# Patient Record
Sex: FEMALE | Race: OTHER
[De-identification: names, ages, dates, MRNs, and addresses within clinical notes are randomized per-mention and may not be internally consistent; named-entity substitution may affect disease eponyms.]

---

## 2021-10-20 ENCOUNTER — HOSPITAL ENCOUNTER (EMERGENCY)
Dept: HOSPITAL 61 - ER | Age: 31
LOS: 1 days | Discharge: HOME | End: 2021-10-21
Payer: SELF-PAY

## 2021-10-20 VITALS — BODY MASS INDEX: 34.21 KG/M2 | HEIGHT: 64 IN | WEIGHT: 200.4 LBS

## 2021-10-20 DIAGNOSIS — N39.0: ICD-10-CM

## 2021-10-20 DIAGNOSIS — O23.41: ICD-10-CM

## 2021-10-20 DIAGNOSIS — Z3A.01: ICD-10-CM

## 2021-10-20 DIAGNOSIS — O20.0: Primary | ICD-10-CM

## 2021-10-20 PROCEDURE — 81025 URINE PREGNANCY TEST: CPT

## 2021-10-20 PROCEDURE — 84702 CHORIONIC GONADOTROPIN TEST: CPT

## 2021-10-20 PROCEDURE — 86850 RBC ANTIBODY SCREEN: CPT

## 2021-10-20 PROCEDURE — 80053 COMPREHEN METABOLIC PANEL: CPT

## 2021-10-20 PROCEDURE — 86901 BLOOD TYPING SEROLOGIC RH(D): CPT

## 2021-10-20 PROCEDURE — 76801 OB US < 14 WKS SINGLE FETUS: CPT

## 2021-10-20 PROCEDURE — 36415 COLL VENOUS BLD VENIPUNCTURE: CPT

## 2021-10-20 PROCEDURE — 87086 URINE CULTURE/COLONY COUNT: CPT

## 2021-10-20 PROCEDURE — 85025 COMPLETE CBC W/AUTO DIFF WBC: CPT

## 2021-10-20 PROCEDURE — 76817 TRANSVAGINAL US OBSTETRIC: CPT

## 2021-10-20 PROCEDURE — 86900 BLOOD TYPING SEROLOGIC ABO: CPT

## 2021-10-20 PROCEDURE — 99284 EMERGENCY DEPT VISIT MOD MDM: CPT

## 2021-10-20 PROCEDURE — 81001 URINALYSIS AUTO W/SCOPE: CPT

## 2021-10-21 VITALS — SYSTOLIC BLOOD PRESSURE: 104 MMHG | DIASTOLIC BLOOD PRESSURE: 55 MMHG

## 2021-10-21 LAB
ALBUMIN SERPL-MCNC: 3.1 G/DL (ref 3.4–5)
ALBUMIN/GLOB SERPL: 0.8 {RATIO} (ref 1–1.7)
ALP SERPL-CCNC: 54 U/L (ref 46–116)
ALT SERPL-CCNC: 54 U/L (ref 14–59)
AMORPH SED URNS QL MICRO: PRESENT /HPF
ANION GAP SERPL CALC-SCNC: 8 MMOL/L (ref 6–14)
APTT PPP: YELLOW S
AST SERPL-CCNC: 31 U/L (ref 15–37)
BACTERIA #/AREA URNS HPF: (no result) /HPF
BASOPHILS # BLD AUTO: 0 X10^3/UL (ref 0–0.2)
BASOPHILS NFR BLD: 0 % (ref 0–3)
BILIRUB SERPL-MCNC: 0.1 MG/DL (ref 0.2–1)
BILIRUB UR QL STRIP: NEGATIVE
BUN SERPL-MCNC: 11 MG/DL (ref 7–20)
BUN/CREAT SERPL: 22 (ref 6–20)
CALCIUM SERPL-MCNC: 8.9 MG/DL (ref 8.5–10.1)
CHLORIDE SERPL-SCNC: 102 MMOL/L (ref 98–107)
CO2 SERPL-SCNC: 28 MMOL/L (ref 21–32)
CREAT SERPL-MCNC: 0.5 MG/DL (ref 0.6–1)
EOSINOPHIL NFR BLD: 0.2 X10^3/UL (ref 0–0.7)
EOSINOPHIL NFR BLD: 2 % (ref 0–3)
ERYTHROCYTE [DISTWIDTH] IN BLOOD BY AUTOMATED COUNT: 15 % (ref 11.5–14.5)
FIBRINOGEN PPP-MCNC: CLEAR MG/DL
GFR SERPLBLD BASED ON 1.73 SQ M-ARVRAT: 145.9 ML/MIN
GLUCOSE SERPL-MCNC: 91 MG/DL (ref 70–99)
HCT VFR BLD CALC: 36.4 % (ref 36–47)
HGB BLD-MCNC: 12.2 G/DL (ref 12–15.5)
LYMPHOCYTES # BLD: 2.4 X10^3/UL (ref 1–4.8)
LYMPHOCYTES NFR BLD AUTO: 27 % (ref 24–48)
MCH RBC QN AUTO: 27 PG (ref 25–35)
MCHC RBC AUTO-ENTMCNC: 34 G/DL (ref 31–37)
MCV RBC AUTO: 80 FL (ref 79–100)
MONO #: 0.7 X10^3/UL (ref 0–1.1)
MONOCYTES NFR BLD: 8 % (ref 0–9)
NEUT #: 5.7 X10^3/UL (ref 1.8–7.7)
NEUTROPHILS NFR BLD AUTO: 63 % (ref 31–73)
NITRITE UR QL STRIP: NEGATIVE
PH UR STRIP: 5.5 [PH]
PLATELET # BLD AUTO: 324 X10^3/UL (ref 140–400)
POTASSIUM SERPL-SCNC: 3.8 MMOL/L (ref 3.5–5.1)
PROT SERPL-MCNC: 6.8 G/DL (ref 6.4–8.2)
PROT UR STRIP-MCNC: NEGATIVE MG/DL
RBC # BLD AUTO: 4.53 X10^6/UL (ref 3.5–5.4)
RBC #/AREA URNS HPF: (no result) /HPF (ref 0–2)
SODIUM SERPL-SCNC: 138 MMOL/L (ref 136–145)
UROBILINOGEN UR-MCNC: 0.2 MG/DL
WBC # BLD AUTO: 9 X10^3/UL (ref 4–11)

## 2021-10-21 NOTE — RAD
EXAMINATION:  US OB <14 WKS +TV (FIRST TRIMESTER PELVIC ULTRASOUND)



CLINICAL HISTORY: VAGINAL BLEEDING DURING 



TECHNIQUE: Sonography of the pelvis was performed by transabdominal and transvaginal techniques.



COMPARISON: None. 





FINDINGS:



Uterus: 

- Orientation: Anteverted 

- Size: 10.2 x 7.4 x 5.6

- Myometrium: Homogeneous echogenicity

- Cervical Length:

    

Gestation:

- Intrauterine Gestational Sac: Single present with normal morphology and subjectively normal amnioti
c fluid volume

- Yolk Sac: Present 

- Embryo: Single present  

- Crown Rump Length: 15 mm, corresponding gestational age 7 weeks 6 days      

- Fetal Heart Rate: 162 bpm

- Perigestational Hemorrhage: Absent



Right Ovary: 

- Size: 3.8 x 2.9 x 2.7 cm

- Normal sonographic appearance and blood flow.  Dominant follicle versus cyst. 



Left Ovary: 

- Size: 3.2 x 3.4 x 2.3

- Normal sonographic appearance and blood flow. 



Pelvic Free Fluid: Absent 





IMPRESSION: 



Single live intrauterine gestation with sonographic estimated gestational age 7 weeks 6 days and cyril
mated date of delivery is 6/3/2022. 



Electronically signed by: Brayden Morales DO (10/21/2021 1:39 AM) [facility]

## 2021-10-21 NOTE — PHYS DOC
Past Medical History


Past Medical History:  No Pertinent History


Past Surgical History:  No Surgical History


Smoking Status:  Never Smoker


Alcohol Use:  Occasionally


Drug Use:  None





General Adult


EDM:


Chief Complaint:  VAGINAL BLEEDING PREGNANCY





HPI:


HPI:


32 yo  F (LMP ) presents the ED with , (patient consents to 

his/her/their knowledge and involvement in pts' medical care), complaints of 

bright red vaginal spotting, intermittently for the past month.  Patient states 

she got concerned because she is passed a clot today (has photos on her iphone).

 Reports she is having intermittent, suprapubic cramps as if she is about to 

have her menses.  Had a positive urine pregnancy test at [facility] on 

, was told she was 5 weeks pregnant at this time.  Patient reports 

she has monogamous with 1 partner, her , newlywed's of 6 months.  Denies 

any associated vaginal odor, itching or dyspareunia.  Denies any IV drug use, 

alcohol use or tobacco use.  Has no past surgical history.  Has no routine 

primary care physician and takes no prescribed medications. Reports h/o left 

ovarian cyst. Is not concerned for any sexually transmitted diseases.





Review of Systems:


Review of Systems:


Constitutional:   Denies fever or chills. []


Eyes:   Denies change in visual acuity. []


HENT:   Denies nasal congestion or sore throat. [] 


Respiratory:   Denies cough or shortness of breath. [] 


Cardiovascular:   Denies chest pain or edema. [] 


GI:   Denies  nausea, vomiting, bloody stools or diarrhea. [] 


:  Denies dysuria or hematuria


Musculoskeletal:   Denies back pain or flank pain. [] 


Integument:   Denies rash or diaphoresis


Neurologic:   Denies headache, focal weakness or sensory changes. [] 


Endocrine:   Denies polyuria or polydipsia. [] 


Lymphatic:  Denies swollen glands. [] 


Psychiatric:  Denies depression or anxiety. []





Heart Score:


C/O Chest Pain:  No


Risk Factors:


Risk Factors:  DM, Current or recent (<one month) smoker, HTN, HLP, family 

history of CAD, obesity.


Risk Scores:


Score 0 - 3:  2.5% MACE over next 6 weeks - Discharge Home


Score 4 - 6:  20.3% MACE over next 6 weeks - Admit for Clinical Observation


Score 7 - 10:  72.7% MACE over next 6 weeks - Early Invasive Strategies





Allergies:


Allergies:





Allergies








Coded Allergies Type Severity Reaction Last Updated Verified


 


  No Known Drug Allergies    10/7/14 No











Physical Exam:


PE:





Constitutional: Well developed, well nourished, no acute distress, non-toxic 

appearance. 


HENT: Normocephalic, atraumatic,


Eyes: EOMI, conjunctiva normal, no discharge.  


Neck: Normal range of motion,  supple, 


Cardiovascular: S1/2 present, regular rhythm


Lungs & Thorax: Speaking in full sentences, bilateral equal chest rise, no 

tachypnea or increased work of breathing


Abdomen:  soft, no tenderness, 


Skin: Warm, dry, no erythema, no rash. [] 


Back: No tenderness, no CVA tenderness. [] 


Extremities: No tenderness, no cyanosis, no lower extremity edema


Neurologic: Alert and oriented X 3, normal motor function, normal sensory 

function, no focal deficits noted. []


Psychologic: Affect normal, judgement normal, mood normal. []


Pelvic: pt declines stating I'd rather know the baby is okay, she is not 

concerned for sexually transmitted diseases





Current Patient Data:


Labs:





                                Laboratory Tests








Test


 10/20/21


21:31 10/20/21


21:33


 


Urine Collection Type Unknown   


 


Urine Color Yellow   


 


Urine Clarity Clear   


 


Urine pH


 5.5 (<5.0-8.0)


 





 


Urine Specific Gravity


 >=1.030


(1.000-1.030) 





 


Urine Protein


 Negative mg/dL


(NEG-TRACE) 





 


Urine Glucose (UA)


 Negative mg/dL


(NEG) 





 


Urine Ketones (Stick)


 Negative mg/dL


(NEG) 





 


Urine Blood Small (NEG)   


 


Urine Nitrite


 Negative (NEG)


 





 


Urine Bilirubin


 Negative (NEG)


 





 


Urine Urobilinogen Dipstick


 0.2 mg/dL (0.2


mg/dL) 





 


Urine Leukocyte Esterase


 Moderate (NEG)


 





 


Urine RBC


 3-5 /HPF (0-2)


 





 


Urine WBC


 11-20 /HPF


(0-4) 





 


Urine Squamous Epithelial


Cells Mod /LPF  


 





 


Urine Amorphous Sediment Present /HPF   


 


Urine Bacteria


 Few /HPF


(0-FEW) 





 


Urine Mucus Marked /LPF   


 


POC Urine HCG, Qualitative


 


 Hcg positive


(Negative)








Vital Signs:





                                   Vital Signs








  Date Time  Temp Pulse Resp B/P (MAP) Pulse Ox O2 Delivery O2 Flow Rate FiO2


 


10/21/21 00:13 98.2 69 16 115/58 (77) 99 Room Air  





 98.2       











EKG:


EKG:


[]





Radiology/Procedures:


Radiology/Procedures:


                                 IMAGING REPORT





                                     Signed





PATIENT: GENIE LINDQUIST  ACCOUNT: [id number]     MRN#: [id number]


: 1990           LOCATION: ER              AGE: 31


SEX: F                    EXAM DT: 10/21/21         ACCESSION#: [id number]


STATUS: REG ER            ORD. PHYSICIAN: EDIN CHOW DO


REASON: VAGINAL BLEEDING DURING PREGNANCY


PROCEDURE: OB <14 WKS W/TV





EXAMINATION:  US OB <14 WKS +TV (FIRST TRIMESTER PELVIC ULTRASOUND)





CLINICAL HISTORY: VAGINAL BLEEDING DURING 





TECHNIQUE: Sonography of the pelvis was performed by transabdominal and 

transvaginal techniques.





COMPARISON: None. 








FINDINGS:





Uterus: 


- Orientation: Anteverted 


- Size: 10.2 x 7.4 x 5.6


- Myometrium: Homogeneous echogenicity


- Cervical Length:


    


Gestation:


- Intrauterine Gestational Sac: Single present with normal morphology and 

subjectively normal amniotic fluid volume


- Yolk Sac: Present 


- Embryo: Single present  


- Crown Rump Length: 15 mm, corresponding gestational age 7 weeks 6 days      


- Fetal Heart Rate: 162 bpm


- Perigestational Hemorrhage: Absent





Right Ovary: 


- Size: 3.8 x 2.9 x 2.7 cm


- Normal sonographic appearance and blood flow.  Dominant follicle versus cyst. 





Left Ovary: 


- Size: 3.2 x 3.4 x 2.3


- Normal sonographic appearance and blood flow. 





Pelvic Free Fluid: Absent 








IMPRESSION: 





Single live intrauterine gestation with sonographic estimated gestational age 7 

weeks 6 days and estimated date of delivery is 6/3/2022. 





Electronically signed by: Brayden Morales DO (10/21/2021 1:39 AM) [facility]





Course & Med Decision Making:


Course & Med Decision Making


Pertinent Labs and Imaging studies reviewed. (See chart for details)





Concern for threatened miscarriage and hemodynamically stable patient with 

complicated UTI. Suspect abdominal cramping is likely more related to urinary 

tract infection. Patient is a patient's blood flow is very scant on toilet paper

 with 1 small blood clot less than 1 cm. Patient declines pelvic exam stating 

she does not have any active pelvic pain, no vaginal discharge and has no 

concerns for STIs.  patient relieved to hear she has an intrauterine 

pregnancy. Patient is not a candidate for RhoGam. Will prescribe Vantin. Will 

discharge home with strict ED return precautions were given for brisk vaginal 

bleeding, worsening abdominal pain, fever or flulike symptoms. Encouraged urgent

 outpatient follow-up with PMD and OB/GYN for definitive management in the next 

1 to 2 weeks.  Life-threatening processes were considered but are low suspicion 

at this time, given history, physical exam and ED workup. Pt was educated on all

 prescription medications and adverse effects.  All patient's questions were 

answered and pt was stable at time of discharge.





Life/limb-threatening differential includes but is not limited to, ectopic 

pregnancy, septic , sepsis/infection (endometritis, sti/pid,  cystitis, 

pyelonephritis, Dorothy's gangrene or necrotizing fasciitis, abscess), ovarian 

torsion, ruptured hemorrhagic ovarian cyst, endometriosis, ureterolithiasis, 

thrombophlebitis, hemorrhage/DIC, organ prolapse, abdominal aortic aneurysm, 

mesenteric ischemia, neoplasm, bowel obstruction or surgical abdomen.





I have spoken with the patient and/or caregivers.  I explained the patient's 

condition, diagnoses and treatment plan based on the information available to me

 at this time.  I have answered the patient and/or caregiver's questions and 

addressed any concerns.  The patient and/or caregivers have a good understanding

 of patient's diagnosis, condition and treatment plan as can be expected at this

 point.  Vital signs have been stable.  Patient's condition is stable and 

appropriate for discharge from the emergency department. 





Patient will pursue further outpatient evaluation with primary care physician or

 other designated or consulting physician as outlined in the discharge 

instructions.  The patient and/or caregivers are agreeable to this plan of care 

and follow-up instructions have been explained in detail.  The patient and/or 

caregivers have received these instructions in written form and have expressed 

an understanding of the discharge instructions.  The patient and/or caregivers 

are aware that any significant change of condition or worsening of symptoms 

should prompt immediate return to this or the closest emergency department or 

call to 911.





Milagros Disclaimer:


Dragon Disclaimer:


This electronic medical record was generated, in whole or in part, using a voice

 recognition dictation system.





Departure


Departure


Impression:  


   Primary Impression:  


   Threatened miscarriage in early pregnancy


   Additional Impression:  


   UTI (urinary tract infection)


Disposition:   HOME / SELF CARE / HOMELESS


Condition:  STABLE


Referrals:  


NO PCP (PCP)


Follow-up with your primary care physician in 24 to 48 hours OR


FOLLOW UP WITH FAMILY MEDICINE:


[address]


[place] [address]


Phone: [phone]


Patient Instructions:  Threatened Miscarriage, Urinary Tract Infection





Additional Instructions:  


FOLLOW UP WITH  OB/GYN:  FOR DEFINITIVE MANAGEMENT within 1-2 weeks, RETURN TO 

ED immediately if you should develop any increased vaginal bleeding, severe pain

 or brisk bleeding


[facility]


[address]


[place] [address]


Phone: [phone]





EMERGENCY DEPARTMENT GENERAL DISCHARGE INSTRUCTIONS





Thank you for coming to [facility] Emergency Department (ED) 

today and 


trusting us with you care.  We trust that you had a positive experience in our 

Emergency 


Department.  If you wish to speak to the department management, you may call the

 Director at 


[phone].





YOUR FOLLOW UP INSTRUCTIONS ARE AS FOLLOWS:





1.  Do you have a private Doctor?  If you do not have a private doctor, please 

ask for a 


resource list of physicians or clinics that may be able to assist you with 

follow up care.





2.  The Emergency Physicain has interpreted your x-rays.  The X-Ray specialist 

will also 


review them.  If there is a change in the findings, you will be notified in 48 

hours when at 


all possible.





3.  A lab test or culture has been done, your results will be reviewed and you 

will be 


notified if you need a change in treatment.





ADDITIONAL INSTRUCTIONS AND INFORMATION:





1.  Your care today has been supervised by a physician who is specially trained 

in emergency 


care.  Many problems require more than one evaluation for a complete diagnosis 

and 


treatment.  We recommend that you schedule your follow up appointment as 

recommended to 


ensure complete treatment of you illness or injury.  If you are unable to obtain

 follow up 


care and continue to have a problem, or if your condition worsens, we recommend 

that you 


return to the ED.





2.  We are not able to safely determine your condition over the phone nor are we

 able to 


give sound medical advice over the phone.  For these safety reasons, if you call

 for medical 


advice we will ask you to come to the ED for further evaluation.





3.  If you have any questions regarding these discharge instructions please call

 the ED at 


[phone].





SAFETY INFORMATION:





In the interest of safety, wellness, and injury prevention; we encourage you to 

wear your 


sealbelt, if you smoke; quite smoking, and we encourage family to use a 

protective helmet 


for bicycling and other sporting events that present an increased risk for head 

injury.





IF YOUR SYMPTOMS WORSEN OR NEW SYMPTOMS DEVELOP, OR YOU HAVE CONCERNS ABOUT YOUR

 CONDITION; 


OR IF YOUR CONDITION WORSENS WHILE YOU ARE WAITING FOR YOUR FOLLOW UP 

APPOINTMENT; EITHER 


CONTACT YOUR PRIMARY CARE DOCTOR, THE PHYSICIAN WHOSE NAME AND NUMBER YOU WERE 

GIVEN, OR 


RETURN TO THE ED IMMEDIATELY.


Scripts


Cefpodoxime Proxetil (CEFPODOXIME PROXETIL) 200 Mg Tablet


1 TAB PO BID for 10 Days, #20 TAB


   Prov: EDIN CHOW DO         10/21/21











EDIN CHOW DO               Oct 21, 2021 01:01